# Patient Record
Sex: FEMALE | Race: ASIAN | NOT HISPANIC OR LATINO | URBAN - METROPOLITAN AREA
[De-identification: names, ages, dates, MRNs, and addresses within clinical notes are randomized per-mention and may not be internally consistent; named-entity substitution may affect disease eponyms.]

---

## 2017-07-17 ENCOUNTER — OUTPATIENT (OUTPATIENT)
Dept: OUTPATIENT SERVICES | Facility: HOSPITAL | Age: 62
LOS: 1 days | End: 2017-07-17
Payer: COMMERCIAL

## 2017-07-17 ENCOUNTER — APPOINTMENT (OUTPATIENT)
Dept: MAMMOGRAPHY | Facility: IMAGING CENTER | Age: 62
End: 2017-07-17

## 2017-07-17 ENCOUNTER — APPOINTMENT (OUTPATIENT)
Dept: ULTRASOUND IMAGING | Facility: IMAGING CENTER | Age: 62
End: 2017-07-17

## 2017-07-17 DIAGNOSIS — R92.2 INCONCLUSIVE MAMMOGRAM: ICD-10-CM

## 2017-07-17 PROCEDURE — G0279: CPT

## 2017-07-17 PROCEDURE — 77066 DX MAMMO INCL CAD BI: CPT

## 2017-07-17 PROCEDURE — 76641 ULTRASOUND BREAST COMPLETE: CPT

## 2018-02-28 ENCOUNTER — OUTPATIENT (OUTPATIENT)
Dept: OUTPATIENT SERVICES | Facility: HOSPITAL | Age: 63
LOS: 1 days | End: 2018-02-28
Payer: COMMERCIAL

## 2018-02-28 ENCOUNTER — APPOINTMENT (OUTPATIENT)
Dept: ULTRASOUND IMAGING | Facility: IMAGING CENTER | Age: 63
End: 2018-02-28
Payer: COMMERCIAL

## 2018-02-28 ENCOUNTER — APPOINTMENT (OUTPATIENT)
Dept: MAMMOGRAPHY | Facility: IMAGING CENTER | Age: 63
End: 2018-02-28
Payer: COMMERCIAL

## 2018-02-28 DIAGNOSIS — R92.8 OTHER ABNORMAL AND INCONCLUSIVE FINDINGS ON DIAGNOSTIC IMAGING OF BREAST: ICD-10-CM

## 2018-02-28 PROCEDURE — 76641 ULTRASOUND BREAST COMPLETE: CPT | Mod: 26,50

## 2018-02-28 PROCEDURE — 76641 ULTRASOUND BREAST COMPLETE: CPT

## 2018-02-28 PROCEDURE — G0279: CPT

## 2018-02-28 PROCEDURE — 77066 DX MAMMO INCL CAD BI: CPT | Mod: 26

## 2018-02-28 PROCEDURE — 77066 DX MAMMO INCL CAD BI: CPT

## 2018-02-28 PROCEDURE — G0279: CPT | Mod: 26

## 2018-03-02 ENCOUNTER — RESULT REVIEW (OUTPATIENT)
Age: 63
End: 2018-03-02

## 2018-03-02 ENCOUNTER — OUTPATIENT (OUTPATIENT)
Dept: OUTPATIENT SERVICES | Facility: HOSPITAL | Age: 63
LOS: 1 days | End: 2018-03-02
Payer: COMMERCIAL

## 2018-03-02 ENCOUNTER — APPOINTMENT (OUTPATIENT)
Dept: ULTRASOUND IMAGING | Facility: IMAGING CENTER | Age: 63
End: 2018-03-02
Payer: COMMERCIAL

## 2018-03-02 DIAGNOSIS — R92.8 OTHER ABNORMAL AND INCONCLUSIVE FINDINGS ON DIAGNOSTIC IMAGING OF BREAST: ICD-10-CM

## 2018-03-02 PROCEDURE — 19083 BX BREAST 1ST LESION US IMAG: CPT

## 2018-03-02 PROCEDURE — 88305 TISSUE EXAM BY PATHOLOGIST: CPT

## 2018-03-02 PROCEDURE — 77065 DX MAMMO INCL CAD UNI: CPT | Mod: 26,RT

## 2018-03-02 PROCEDURE — 77065 DX MAMMO INCL CAD UNI: CPT

## 2018-03-02 PROCEDURE — 88305 TISSUE EXAM BY PATHOLOGIST: CPT | Mod: 26

## 2018-03-02 PROCEDURE — A4648: CPT

## 2018-03-02 PROCEDURE — 19083 BX BREAST 1ST LESION US IMAG: CPT | Mod: RT

## 2018-03-05 LAB — SURGICAL PATHOLOGY STUDY: SIGNIFICANT CHANGE UP

## 2018-09-04 ENCOUNTER — APPOINTMENT (OUTPATIENT)
Dept: ULTRASOUND IMAGING | Facility: IMAGING CENTER | Age: 63
End: 2018-09-04
Payer: COMMERCIAL

## 2018-09-04 ENCOUNTER — OUTPATIENT (OUTPATIENT)
Dept: OUTPATIENT SERVICES | Facility: HOSPITAL | Age: 63
LOS: 1 days | End: 2018-09-04
Payer: COMMERCIAL

## 2018-09-04 DIAGNOSIS — Z00.8 ENCOUNTER FOR OTHER GENERAL EXAMINATION: ICD-10-CM

## 2018-09-04 PROCEDURE — 76642 ULTRASOUND BREAST LIMITED: CPT

## 2018-09-04 PROCEDURE — 76642 ULTRASOUND BREAST LIMITED: CPT | Mod: 26,50

## 2020-01-02 ENCOUNTER — OUTPATIENT (OUTPATIENT)
Dept: OUTPATIENT SERVICES | Facility: HOSPITAL | Age: 65
LOS: 1 days | End: 2020-01-02
Payer: MEDICARE

## 2020-01-02 ENCOUNTER — APPOINTMENT (OUTPATIENT)
Dept: MAMMOGRAPHY | Facility: IMAGING CENTER | Age: 65
End: 2020-01-02
Payer: MEDICARE

## 2020-01-02 ENCOUNTER — APPOINTMENT (OUTPATIENT)
Dept: ULTRASOUND IMAGING | Facility: IMAGING CENTER | Age: 65
End: 2020-01-02
Payer: MEDICARE

## 2020-01-02 DIAGNOSIS — Z00.8 ENCOUNTER FOR OTHER GENERAL EXAMINATION: ICD-10-CM

## 2020-01-02 PROCEDURE — 77067 SCR MAMMO BI INCL CAD: CPT | Mod: 26

## 2020-01-02 PROCEDURE — 76641 ULTRASOUND BREAST COMPLETE: CPT

## 2020-01-02 PROCEDURE — 77063 BREAST TOMOSYNTHESIS BI: CPT | Mod: 26

## 2020-01-02 PROCEDURE — 76641 ULTRASOUND BREAST COMPLETE: CPT | Mod: 26,50

## 2020-01-02 PROCEDURE — 77063 BREAST TOMOSYNTHESIS BI: CPT

## 2020-01-02 PROCEDURE — 77067 SCR MAMMO BI INCL CAD: CPT

## 2021-04-01 ENCOUNTER — APPOINTMENT (OUTPATIENT)
Dept: ULTRASOUND IMAGING | Facility: IMAGING CENTER | Age: 66
End: 2021-04-01
Payer: MEDICARE

## 2021-04-01 ENCOUNTER — APPOINTMENT (OUTPATIENT)
Dept: RADIOLOGY | Facility: IMAGING CENTER | Age: 66
End: 2021-04-01

## 2021-04-01 ENCOUNTER — RESULT REVIEW (OUTPATIENT)
Age: 66
End: 2021-04-01

## 2021-04-01 ENCOUNTER — APPOINTMENT (OUTPATIENT)
Dept: MAMMOGRAPHY | Facility: IMAGING CENTER | Age: 66
End: 2021-04-01
Payer: MEDICARE

## 2021-04-01 ENCOUNTER — OUTPATIENT (OUTPATIENT)
Dept: OUTPATIENT SERVICES | Facility: HOSPITAL | Age: 66
LOS: 1 days | End: 2021-04-01
Payer: MEDICARE

## 2021-04-01 DIAGNOSIS — Z00.8 ENCOUNTER FOR OTHER GENERAL EXAMINATION: ICD-10-CM

## 2021-04-01 PROCEDURE — 77067 SCR MAMMO BI INCL CAD: CPT | Mod: 26,59

## 2021-04-01 PROCEDURE — 77065 DX MAMMO INCL CAD UNI: CPT

## 2021-04-01 PROCEDURE — 88305 TISSUE EXAM BY PATHOLOGIST: CPT | Mod: 26

## 2021-04-01 PROCEDURE — 77067 SCR MAMMO BI INCL CAD: CPT

## 2021-04-01 PROCEDURE — 77063 BREAST TOMOSYNTHESIS BI: CPT

## 2021-04-01 PROCEDURE — A4648: CPT

## 2021-04-01 PROCEDURE — 77063 BREAST TOMOSYNTHESIS BI: CPT | Mod: 26,59

## 2021-04-01 PROCEDURE — 19083 BX BREAST 1ST LESION US IMAG: CPT | Mod: LT

## 2021-04-01 PROCEDURE — 76641 ULTRASOUND BREAST COMPLETE: CPT | Mod: 26,50

## 2021-04-01 PROCEDURE — 77065 DX MAMMO INCL CAD UNI: CPT | Mod: 26,LT

## 2021-04-01 PROCEDURE — 88305 TISSUE EXAM BY PATHOLOGIST: CPT

## 2021-04-01 PROCEDURE — 76641 ULTRASOUND BREAST COMPLETE: CPT

## 2021-04-01 PROCEDURE — 19083 BX BREAST 1ST LESION US IMAG: CPT

## 2021-07-01 ENCOUNTER — OUTPATIENT (OUTPATIENT)
Dept: OUTPATIENT SERVICES | Facility: HOSPITAL | Age: 66
LOS: 1 days | End: 2021-07-01
Payer: MEDICARE

## 2021-07-01 VITALS
OXYGEN SATURATION: 96 % | WEIGHT: 164.91 LBS | DIASTOLIC BLOOD PRESSURE: 75 MMHG | RESPIRATION RATE: 16 BRPM | HEART RATE: 71 BPM | SYSTOLIC BLOOD PRESSURE: 111 MMHG | TEMPERATURE: 98 F | HEIGHT: 64 IN

## 2021-07-01 DIAGNOSIS — Z01.818 ENCOUNTER FOR OTHER PREPROCEDURAL EXAMINATION: ICD-10-CM

## 2021-07-01 DIAGNOSIS — D48.62 NEOPLASM OF UNCERTAIN BEHAVIOR OF LEFT BREAST: ICD-10-CM

## 2021-07-01 DIAGNOSIS — D24.2 BENIGN NEOPLASM OF LEFT BREAST: ICD-10-CM

## 2021-07-01 DIAGNOSIS — Z90.710 ACQUIRED ABSENCE OF BOTH CERVIX AND UTERUS: Chronic | ICD-10-CM

## 2021-07-01 LAB
ANION GAP SERPL CALC-SCNC: 11 MMOL/L — SIGNIFICANT CHANGE UP (ref 5–17)
BUN SERPL-MCNC: 14 MG/DL — SIGNIFICANT CHANGE UP (ref 7–23)
CALCIUM SERPL-MCNC: 9.7 MG/DL — SIGNIFICANT CHANGE UP (ref 8.4–10.5)
CHLORIDE SERPL-SCNC: 105 MMOL/L — SIGNIFICANT CHANGE UP (ref 96–108)
CO2 SERPL-SCNC: 23 MMOL/L — SIGNIFICANT CHANGE UP (ref 22–31)
CREAT SERPL-MCNC: 0.97 MG/DL — SIGNIFICANT CHANGE UP (ref 0.5–1.3)
GLUCOSE SERPL-MCNC: 93 MG/DL — SIGNIFICANT CHANGE UP (ref 70–99)
HCT VFR BLD CALC: 42.4 % — SIGNIFICANT CHANGE UP (ref 34.5–45)
HGB BLD-MCNC: 13.8 G/DL — SIGNIFICANT CHANGE UP (ref 11.5–15.5)
MCHC RBC-ENTMCNC: 29.7 PG — SIGNIFICANT CHANGE UP (ref 27–34)
MCHC RBC-ENTMCNC: 32.5 GM/DL — SIGNIFICANT CHANGE UP (ref 32–36)
MCV RBC AUTO: 91.2 FL — SIGNIFICANT CHANGE UP (ref 80–100)
NRBC # BLD: 0 /100 WBCS — SIGNIFICANT CHANGE UP (ref 0–0)
PLATELET # BLD AUTO: 251 K/UL — SIGNIFICANT CHANGE UP (ref 150–400)
POTASSIUM SERPL-MCNC: 4.6 MMOL/L — SIGNIFICANT CHANGE UP (ref 3.5–5.3)
POTASSIUM SERPL-SCNC: 4.6 MMOL/L — SIGNIFICANT CHANGE UP (ref 3.5–5.3)
RBC # BLD: 4.65 M/UL — SIGNIFICANT CHANGE UP (ref 3.8–5.2)
RBC # FLD: 12.5 % — SIGNIFICANT CHANGE UP (ref 10.3–14.5)
SODIUM SERPL-SCNC: 139 MMOL/L — SIGNIFICANT CHANGE UP (ref 135–145)
WBC # BLD: 8.56 K/UL — SIGNIFICANT CHANGE UP (ref 3.8–10.5)
WBC # FLD AUTO: 8.56 K/UL — SIGNIFICANT CHANGE UP (ref 3.8–10.5)

## 2021-07-01 PROCEDURE — 85027 COMPLETE CBC AUTOMATED: CPT

## 2021-07-01 PROCEDURE — G0463: CPT

## 2021-07-01 PROCEDURE — 80048 BASIC METABOLIC PNL TOTAL CA: CPT

## 2021-07-01 RX ORDER — CHLORHEXIDINE GLUCONATE 213 G/1000ML
1 SOLUTION TOPICAL ONCE
Refills: 0 | Status: DISCONTINUED | OUTPATIENT
Start: 2021-07-15 | End: 2021-07-29

## 2021-07-01 RX ORDER — LIDOCAINE HCL 20 MG/ML
0.2 VIAL (ML) INJECTION ONCE
Refills: 0 | Status: DISCONTINUED | OUTPATIENT
Start: 2021-07-15 | End: 2021-07-29

## 2021-07-01 RX ORDER — SODIUM CHLORIDE 9 MG/ML
3 INJECTION INTRAMUSCULAR; INTRAVENOUS; SUBCUTANEOUS EVERY 8 HOURS
Refills: 0 | Status: DISCONTINUED | OUTPATIENT
Start: 2021-07-15 | End: 2021-07-29

## 2021-07-01 NOTE — H&P PST ADULT - HISTORY OF PRESENT ILLNESS
66 y.o. female with h/o HTN, who underwent a check-up mammogram which revealed 2 masses in the Left breast, core biopsy suspicious for papilloma. Patient is scheduled for Left Breast Erin  Localization Excisional Biopsy.     Pedro recent Covid-19 symptoms or exposure, fully vaccinated.

## 2021-07-01 NOTE — H&P PST ADULT - ATTENDING COMMENTS
The patient is a 66 year old female who presents to undergo a left tavo  localization breast biopsy for a recent US biopsy which revealed a possible papilloma.

## 2021-07-01 NOTE — H&P PST ADULT - NSICDXPASTMEDICALHX_GEN_ALL_CORE_FT
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

## 2021-07-01 NOTE — H&P PST ADULT - NSICDXPROBLEM_GEN_ALL_CORE_FT
PROBLEM DIAGNOSES  Problem: Benign neoplasm breast, left  Assessment and Plan: Left Breast Erin  Localization Excisional Biopsy.

## 2021-07-09 PROBLEM — I10 ESSENTIAL (PRIMARY) HYPERTENSION: Chronic | Status: ACTIVE | Noted: 2021-07-01

## 2021-07-09 PROBLEM — D21.9 BENIGN NEOPLASM OF CONNECTIVE AND OTHER SOFT TISSUE, UNSPECIFIED: Chronic | Status: ACTIVE | Noted: 2021-07-01

## 2021-07-09 PROBLEM — D48.62 NEOPLASM OF UNCERTAIN BEHAVIOR OF LEFT BREAST: Chronic | Status: ACTIVE | Noted: 2021-07-01

## 2021-07-14 ENCOUNTER — APPOINTMENT (OUTPATIENT)
Dept: ULTRASOUND IMAGING | Facility: IMAGING CENTER | Age: 66
End: 2021-07-14
Payer: MEDICARE

## 2021-07-14 ENCOUNTER — OUTPATIENT (OUTPATIENT)
Dept: OUTPATIENT SERVICES | Facility: HOSPITAL | Age: 66
LOS: 1 days | End: 2021-07-14
Payer: MEDICARE

## 2021-07-14 ENCOUNTER — TRANSCRIPTION ENCOUNTER (OUTPATIENT)
Age: 66
End: 2021-07-14

## 2021-07-14 ENCOUNTER — APPOINTMENT (OUTPATIENT)
Dept: RADIOLOGY | Facility: IMAGING CENTER | Age: 66
End: 2021-07-14

## 2021-07-14 ENCOUNTER — APPOINTMENT (OUTPATIENT)
Dept: RADIOLOGY | Facility: IMAGING CENTER | Age: 66
End: 2021-07-14
Payer: MEDICARE

## 2021-07-14 DIAGNOSIS — D48.62 NEOPLASM OF UNCERTAIN BEHAVIOR OF LEFT BREAST: ICD-10-CM

## 2021-07-14 DIAGNOSIS — Z00.8 ENCOUNTER FOR OTHER GENERAL EXAMINATION: ICD-10-CM

## 2021-07-14 DIAGNOSIS — Z86.018 PERSONAL HISTORY OF OTHER BENIGN NEOPLASM: ICD-10-CM

## 2021-07-14 DIAGNOSIS — Z90.710 ACQUIRED ABSENCE OF BOTH CERVIX AND UTERUS: Chronic | ICD-10-CM

## 2021-07-14 DIAGNOSIS — Z80.3 FAMILY HISTORY OF MALIGNANT NEOPLASM OF BREAST: ICD-10-CM

## 2021-07-14 PROCEDURE — 19285 PERQ DEV BREAST 1ST US IMAG: CPT

## 2021-07-14 PROCEDURE — 19285 PERQ DEV BREAST 1ST US IMAG: CPT | Mod: LT

## 2021-07-14 PROCEDURE — 77080 DXA BONE DENSITY AXIAL: CPT | Mod: 26

## 2021-07-14 PROCEDURE — C1739: CPT

## 2021-07-14 PROCEDURE — 77080 DXA BONE DENSITY AXIAL: CPT

## 2021-07-15 ENCOUNTER — OUTPATIENT (OUTPATIENT)
Dept: OUTPATIENT SERVICES | Facility: HOSPITAL | Age: 66
LOS: 1 days | End: 2021-07-15
Payer: MEDICARE

## 2021-07-15 ENCOUNTER — RESULT REVIEW (OUTPATIENT)
Age: 66
End: 2021-07-15

## 2021-07-15 VITALS
TEMPERATURE: 99 F | SYSTOLIC BLOOD PRESSURE: 109 MMHG | WEIGHT: 164.91 LBS | HEIGHT: 64 IN | HEART RATE: 70 BPM | DIASTOLIC BLOOD PRESSURE: 72 MMHG | RESPIRATION RATE: 14 BRPM | OXYGEN SATURATION: 97 %

## 2021-07-15 VITALS
RESPIRATION RATE: 16 BRPM | TEMPERATURE: 98 F | SYSTOLIC BLOOD PRESSURE: 116 MMHG | OXYGEN SATURATION: 100 % | DIASTOLIC BLOOD PRESSURE: 77 MMHG | HEART RATE: 80 BPM

## 2021-07-15 DIAGNOSIS — D48.62 NEOPLASM OF UNCERTAIN BEHAVIOR OF LEFT BREAST: ICD-10-CM

## 2021-07-15 DIAGNOSIS — Z01.818 ENCOUNTER FOR OTHER PREPROCEDURAL EXAMINATION: ICD-10-CM

## 2021-07-15 DIAGNOSIS — Z90.710 ACQUIRED ABSENCE OF BOTH CERVIX AND UTERUS: Chronic | ICD-10-CM

## 2021-07-15 PROCEDURE — 76098 X-RAY EXAM SURGICAL SPECIMEN: CPT

## 2021-07-15 PROCEDURE — 88305 TISSUE EXAM BY PATHOLOGIST: CPT | Mod: 26

## 2021-07-15 PROCEDURE — 88305 TISSUE EXAM BY PATHOLOGIST: CPT

## 2021-07-15 PROCEDURE — 76098 X-RAY EXAM SURGICAL SPECIMEN: CPT | Mod: 26

## 2021-07-15 PROCEDURE — 19101 BIOPSY OF BREAST OPEN: CPT | Mod: LT

## 2021-07-15 RX ORDER — ONDANSETRON 8 MG/1
4 TABLET, FILM COATED ORAL ONCE
Refills: 0 | Status: DISCONTINUED | OUTPATIENT
Start: 2021-07-15 | End: 2021-07-29

## 2021-07-15 RX ORDER — OXYCODONE HYDROCHLORIDE 5 MG/1
5 TABLET ORAL ONCE
Refills: 0 | Status: DISCONTINUED | OUTPATIENT
Start: 2021-07-15 | End: 2021-07-15

## 2021-07-15 RX ORDER — SODIUM CHLORIDE 9 MG/ML
1000 INJECTION, SOLUTION INTRAVENOUS
Refills: 0 | Status: DISCONTINUED | OUTPATIENT
Start: 2021-07-15 | End: 2021-07-29

## 2021-07-15 RX ORDER — BENAZEPRIL HYDROCHLORIDE 40 MG/1
1 TABLET ORAL
Qty: 0 | Refills: 0 | DISCHARGE

## 2021-07-15 RX ORDER — AMLODIPINE BESYLATE 2.5 MG/1
1 TABLET ORAL
Qty: 0 | Refills: 0 | DISCHARGE

## 2021-07-15 NOTE — ASU DISCHARGE PLAN (ADULT/PEDIATRIC) - ASU DC SPECIAL INSTRUCTIONSFT
Susan M. Palleschi, MD, FACS  Specializing in Diseases and Surgery of the Breast  [address], [address], [place] [address]  Tel: [phone]		Fax: [phone]    Breast Biopsy/Lumpectomy Discharge Instructions    1.	Follow-up Appointment- Please call the office [phone]) to schedule your post-operative appointment which should be approximately 7-10 days after your surgery.     2.	Bruising/Bleeding/ Swelling – It is normal for there to be some bruising and swelling at the breast biopsy site. Some discomfort at the surgical site is also normal. If your symptoms seem excessive, or if you have any questions or concerns, please call the office.    3.	Supportive Bra- Please bring a sports/athlete bra with a front or back closure with you on the day of surgery. You will wear it home from the hospital. You should wear the supportive bra continuously for 48 hours after your procedure, including wearing it to sleep. Thereafter, you may regular bra. You may remove the bra to shower. The sports bra will provide support, decrease the amount of swelling at the biopsy site and make your recovery more comfortable.    4.	Wound Dressing – The incision(s) will be covered with a special type of surgery glue called Dermabond. It has a purplish hue and looks like plastic. It should stay on the skin until it flakes off naturally over the following 1-2 weeks. Please do NOT peel off the Dermabond. All of the stitches are “internal” and will dissolve naturally.     5.	Ice- You may apply ice to the surgical sites off and on as needed for symptomatic relief.     6.	Showering/Bathing- You may shower over the incision the very next day after surgery. Allow the water to run over the incision, but do NOT scrub the area. It is best not to sit in a bathtub or swimming pool for at least 1 week after surgery.     7.	Activity Level- You may resume most normal daily activity as tolerated, but avoid strenuous activities such as aerobics, jogging, exercising, or heavy lifting for at least 1 week after surgery. You may return to work in 1-2 days after surgery. You may drive as long as you are not taking any prescription pain medication.     8.	Pain Medication- We will send a pain medication prescription to your pharmacy. You may take the prescribed medication, or you may take extra strength Tylenol as needed. Please do NOT take aspirin, Motrin, Advil, or other anti-inflammatory medications, as these medications may cause bleeding and bruising.         Anesthesia Precautions:  For the next 12 hours do not:   •	drive a car,  •	 drink alcohol, beer, or wine,   •	make important personal or business decisions  Diet:   •	Progress diet slowly unless otherwise indicated  Physician Notification  •	Any pharmacy prescription issue  •	Bleeding that does not stop  •	Persistent nausea and vomiting  •	Pain not relieved by medications  •	Fever greater than 101®F  •	Inability to tolerate liquids or foods  •	Unable to urinate after 8 hours  Discharge and Disposition  •	Discharge to home/ group home/assisted living  •	Accompanied by Family/Spouse/ Parents/ Significant Other/ Friend/ and or Caregiver  Follow Up Care:  •	In the event that you develop a complication and you are unable to reach your own physician, you may contact:  [phone] or go to the nearest Emergency Room.   •	Please call your surgeon to schedule your follow up appointment [phone].

## 2021-07-15 NOTE — BRIEF OPERATIVE NOTE - NSICDXBRIEFPROCEDURE_GEN_ALL_CORE_FT
PROCEDURES:  Excisional breast biopsy with radiologic localization 15-Jul-2021 12:33:12  Palleschi, Susan M

## 2021-07-15 NOTE — ASU DISCHARGE PLAN (ADULT/PEDIATRIC) - NURSING INSTRUCTIONS
Next dose of Tylenol will be on or after _____5:45 PM______ ,today/tonight and every 6 hours afterwards as needed for pain management, do not take any Tylenol containing products until this time. Your first dose of Tylenol was given at _______11:45 AM____. Do not exceed more than 4000mg of Tylenol in one 24 hour setting.

## 2021-07-19 LAB — SURGICAL PATHOLOGY STUDY: SIGNIFICANT CHANGE UP

## 2021-11-17 NOTE — ASU PATIENT PROFILE, ADULT - HISTORY OF COVID-19 VACCINATION
I called patient daughter who got her appt for tomorrow.  
PATIENT STILL WHEEZING.  WOULD LIKE A Z-PACK.      PHARMACY:  [facility]    PLEASE CALL [phone]  
Yes

## 2022-04-13 ENCOUNTER — APPOINTMENT (OUTPATIENT)
Dept: MAMMOGRAPHY | Facility: IMAGING CENTER | Age: 67
End: 2022-04-13
Payer: MEDICARE

## 2022-04-13 ENCOUNTER — OUTPATIENT (OUTPATIENT)
Dept: OUTPATIENT SERVICES | Facility: HOSPITAL | Age: 67
LOS: 1 days | End: 2022-04-13
Payer: MEDICARE

## 2022-04-13 ENCOUNTER — APPOINTMENT (OUTPATIENT)
Dept: MAMMOGRAPHY | Facility: IMAGING CENTER | Age: 67
End: 2022-04-13

## 2022-04-13 ENCOUNTER — APPOINTMENT (OUTPATIENT)
Dept: ULTRASOUND IMAGING | Facility: IMAGING CENTER | Age: 67
End: 2022-04-13
Payer: MEDICARE

## 2022-04-13 ENCOUNTER — APPOINTMENT (OUTPATIENT)
Dept: ULTRASOUND IMAGING | Facility: IMAGING CENTER | Age: 67
End: 2022-04-13

## 2022-04-13 DIAGNOSIS — Z90.710 ACQUIRED ABSENCE OF BOTH CERVIX AND UTERUS: Chronic | ICD-10-CM

## 2022-04-13 DIAGNOSIS — Z00.8 ENCOUNTER FOR OTHER GENERAL EXAMINATION: ICD-10-CM

## 2022-04-13 PROCEDURE — 77066 DX MAMMO INCL CAD BI: CPT

## 2022-04-13 PROCEDURE — 77066 DX MAMMO INCL CAD BI: CPT | Mod: 26

## 2022-04-13 PROCEDURE — 76641 ULTRASOUND BREAST COMPLETE: CPT | Mod: 26,50

## 2022-04-13 PROCEDURE — G0279: CPT

## 2022-04-13 PROCEDURE — 76641 ULTRASOUND BREAST COMPLETE: CPT

## 2022-04-13 PROCEDURE — G0279: CPT | Mod: 26

## 2023-04-17 ENCOUNTER — OFFICE (OUTPATIENT)
Dept: URBAN - METROPOLITAN AREA CLINIC 29 | Facility: CLINIC | Age: 68
Setting detail: OPHTHALMOLOGY
End: 2023-04-17
Payer: MEDICARE

## 2023-04-17 DIAGNOSIS — H43.811: ICD-10-CM

## 2023-04-17 DIAGNOSIS — H16.223: ICD-10-CM

## 2023-04-17 DIAGNOSIS — H43.89: ICD-10-CM

## 2023-04-17 DIAGNOSIS — H25.13: ICD-10-CM

## 2023-04-17 DIAGNOSIS — H40.033: ICD-10-CM

## 2023-04-17 DIAGNOSIS — H47.323: ICD-10-CM

## 2023-04-17 DIAGNOSIS — H52.03: ICD-10-CM

## 2023-04-17 DIAGNOSIS — H52.4: ICD-10-CM

## 2023-04-17 PROCEDURE — 92020 GONIOSCOPY: CPT | Performed by: OPHTHALMOLOGY

## 2023-04-17 PROCEDURE — 92133 CPTRZD OPH DX IMG PST SGM ON: CPT | Performed by: OPHTHALMOLOGY

## 2023-04-17 PROCEDURE — 92015 DETERMINE REFRACTIVE STATE: CPT | Performed by: OPHTHALMOLOGY

## 2023-04-17 PROCEDURE — 92014 COMPRE OPH EXAM EST PT 1/>: CPT | Performed by: OPHTHALMOLOGY

## 2023-04-17 ASSESSMENT — PACHYMETRY
OS_CT_CORRECTION: -1
OD_CT_CORRECTION: -4
OS_CT_UM: 551
OD_CT_UM: 603

## 2023-04-17 ASSESSMENT — SPHEQUIV_DERIVED
OS_SPHEQUIV: 1.375
OD_SPHEQUIV: 1.375
OD_SPHEQUIV: 0.75
OS_SPHEQUIV: 1.125
OD_SPHEQUIV: 1.25
OD_SPHEQUIV: 1.375
OS_SPHEQUIV: 1.125

## 2023-04-17 ASSESSMENT — REFRACTION_MANIFEST
OD_ADD: +2.25
OU_VA: 20/20
OD_SPHERE: +0.50
OD_ADD: +2.25
OS_AXIS: 43
OS_ADD: +2.25
OS_VA2: 20/20(J1+)
OD_CYLINDER: SPHERE
OS_CYLINDER: +0.25
OS_SPHERE: +1.00
OS_VA1: 20/20
OS_AXIS: 43
OD_AXIS: 165
OD_VA1: 20/20
OU_VA: 20/20-2
OD_VA1: 20/20
OS_VA1: 20/20
OS_ADD: +2.25
OD_SPHERE: +1.00
OD_VA2: 20/20(J1+)
OS_CYLINDER: +0.25
OS_VA1: 20/25
OD_VA1: 20/20
OD_AXIS: 160
OS_CYLINDER: SPHERE
OD_CYLINDER: +0.75
OS_CYLINDER: SPHERE
OD_SPHERE: +1.00
OS_SPHERE: +1.00
OS_SPHERE: +1.00
OD_SPHERE: +3.00
OD_CYLINDER: +0.50
OD_AXIS: 176
OD_CYLINDER: +0.75
OS_SPHERE: +3.00

## 2023-04-17 ASSESSMENT — REFRACTION_AUTOREFRACTION
OS_CYLINDER: +0.75
OS_AXIS: 8
OD_AXIS: 176
OD_CYLINDER: +1.00
OD_SPHERE: +0.75
OS_SPHERE: +1.00

## 2023-04-17 ASSESSMENT — REFRACTION_CURRENTRX
OD_ADD: +1.75
OS_ADD: +1.75
OS_OVR_VA: 20/
OD_ADD: +2.25
OS_CYLINDER: +0.25
OS_ADD: +2.25
OS_CYLINDER: +0.50
OD_CYLINDER: +0.25
OD_OVR_VA: 20/
OD_SPHERE: +0.75
OS_AXIS: 29
OS_SPHERE: +1.00
OS_AXIS: 50
OD_CYLINDER: +0.75
OD_OVR_VA: 20/
OD_AXIS: 161
OS_OVR_VA: 20/
OS_SPHERE: +0.75
OD_SPHERE: +1.00
OD_AXIS: 159

## 2023-04-17 ASSESSMENT — TONOMETRY
OD_IOP_MMHG: 18
OS_IOP_MMHG: 19

## 2023-04-17 ASSESSMENT — VISUAL ACUITY
OS_BCVA: 20/20
OD_BCVA: 20/25

## 2023-04-17 ASSESSMENT — CONFRONTATIONAL VISUAL FIELD TEST (CVF)
OD_FINDINGS: FULL
OS_FINDINGS: FULL

## 2023-04-17 ASSESSMENT — INCREASING TEAR LAKE - SEVERITY SCORE
OD_INC_TEARLAKE: T
OS_INC_TEARLAKE: T

## 2023-04-17 ASSESSMENT — SUPERFICIAL PUNCTATE KERATITIS (SPK)
OS_SPK: T
OD_SPK: T

## 2024-01-31 ENCOUNTER — OFFICE (OUTPATIENT)
Dept: URBAN - METROPOLITAN AREA CLINIC 29 | Facility: CLINIC | Age: 69
Setting detail: OPHTHALMOLOGY
End: 2024-01-31
Payer: MEDICARE

## 2024-01-31 DIAGNOSIS — H01.005: ICD-10-CM

## 2024-01-31 DIAGNOSIS — H47.323: ICD-10-CM

## 2024-01-31 DIAGNOSIS — H11.152: ICD-10-CM

## 2024-01-31 DIAGNOSIS — H43.813: ICD-10-CM

## 2024-01-31 DIAGNOSIS — H25.13: ICD-10-CM

## 2024-01-31 DIAGNOSIS — H35.362: ICD-10-CM

## 2024-01-31 DIAGNOSIS — H35.361: ICD-10-CM

## 2024-01-31 DIAGNOSIS — H16.223: ICD-10-CM

## 2024-01-31 DIAGNOSIS — H01.002: ICD-10-CM

## 2024-01-31 DIAGNOSIS — H52.03: ICD-10-CM

## 2024-01-31 DIAGNOSIS — H43.89: ICD-10-CM

## 2024-01-31 DIAGNOSIS — H40.033: ICD-10-CM

## 2024-01-31 PROCEDURE — 92133 CPTRZD OPH DX IMG PST SGM ON: CPT | Performed by: OPHTHALMOLOGY

## 2024-01-31 PROCEDURE — 92014 COMPRE OPH EXAM EST PT 1/>: CPT | Performed by: OPHTHALMOLOGY

## 2024-01-31 PROCEDURE — 92020 GONIOSCOPY: CPT | Performed by: OPHTHALMOLOGY

## 2024-01-31 ASSESSMENT — SPHEQUIV_DERIVED
OD_SPHEQUIV: 1.375
OD_SPHEQUIV: 1.375
OS_SPHEQUIV: 1.125
OS_SPHEQUIV: 1.125
OD_SPHEQUIV: 0.75

## 2024-01-31 ASSESSMENT — REFRACTION_MANIFEST
OD_CYLINDER: +0.50
OS_AXIS: 43
OS_CYLINDER: SPHERE
OD_VA2: 20/20(J1+)
OS_VA2: 20/20(J1+)
OD_ADD: +2.25
OD_VA1: 20/20
OD_AXIS: 165
OD_SPHERE: +1.00
OS_ADD: +2.25
OD_VA1: 20/20
OU_VA: 20/20-2
OU_VA: 20/20
OS_VA1: 20/20
OS_SPHERE: +3.00
OS_CYLINDER: +0.25
OS_SPHERE: +1.00
OS_AXIS: 43
OS_ADD: +2.25
OD_ADD: +2.25
OS_SPHERE: +1.00
OD_CYLINDER: +0.75
OD_VA1: 20/20
OD_AXIS: 160
OS_CYLINDER: +0.25
OD_SPHERE: +0.50
OD_AXIS: 176
OD_SPHERE: +3.00
OD_CYLINDER: SPHERE
OS_SPHERE: +1.00
OD_SPHERE: +1.00
OS_VA1: 20/20
OD_CYLINDER: +0.75
OS_VA1: 20/25
OS_CYLINDER: SPHERE

## 2024-01-31 ASSESSMENT — REFRACTION_CURRENTRX
OS_CYLINDER: +0.50
OD_OVR_VA: 20/
OS_SPHERE: +1.00
OS_OVR_VA: 20/
OD_ADD: +1.75
OD_CYLINDER: +0.25
OS_AXIS: 50
OS_OVR_VA: 20/
OD_OVR_VA: 20/
OS_SPHERE: +0.75
OS_ADD: +2.25
OD_SPHERE: +1.00
OS_AXIS: 29
OD_AXIS: 161
OS_ADD: +1.75
OS_CYLINDER: +0.25
OD_SPHERE: +0.75
OD_AXIS: 159
OD_ADD: +2.25
OD_CYLINDER: +0.75

## 2024-01-31 ASSESSMENT — LID EXAM ASSESSMENTS
OS_BLEPHARITIS: LLL T
OD_BLEPHARITIS: RLL T

## 2024-01-31 ASSESSMENT — CONFRONTATIONAL VISUAL FIELD TEST (CVF)
OS_FINDINGS: FULL
OD_FINDINGS: FULL

## 2024-07-09 NOTE — BRIEF OPERATIVE NOTE - NSICDXBRIEFPREOP_GEN_ALL_CORE_FT
PRE-OP DIAGNOSIS:  Papilloma of left breast 15-Jul-2021 12:33:25  Palleschi, Susan M   Urine Pregnancy Test Result: negative Detail Level: None Expiration Date (Optional): 04/08/2025 Performed By: Karla PARK MA

## 2024-07-16 ENCOUNTER — APPOINTMENT (OUTPATIENT)
Dept: MAMMOGRAPHY | Facility: IMAGING CENTER | Age: 69
End: 2024-07-16
Payer: MEDICARE

## 2024-07-16 ENCOUNTER — OUTPATIENT (OUTPATIENT)
Dept: OUTPATIENT SERVICES | Facility: HOSPITAL | Age: 69
LOS: 1 days | End: 2024-07-16
Payer: MEDICARE

## 2024-07-16 DIAGNOSIS — Z00.8 ENCOUNTER FOR OTHER GENERAL EXAMINATION: ICD-10-CM

## 2024-07-16 DIAGNOSIS — Z90.710 ACQUIRED ABSENCE OF BOTH CERVIX AND UTERUS: Chronic | ICD-10-CM

## 2024-07-16 PROCEDURE — 77067 SCR MAMMO BI INCL CAD: CPT

## 2024-07-16 PROCEDURE — 77067 SCR MAMMO BI INCL CAD: CPT | Mod: 26

## 2024-07-16 PROCEDURE — 77063 BREAST TOMOSYNTHESIS BI: CPT

## 2024-07-16 PROCEDURE — 77063 BREAST TOMOSYNTHESIS BI: CPT | Mod: 26

## 2024-11-18 ENCOUNTER — OFFICE (OUTPATIENT)
Dept: URBAN - METROPOLITAN AREA CLINIC 29 | Facility: CLINIC | Age: 69
Setting detail: OPHTHALMOLOGY
End: 2024-11-18
Payer: MEDICARE

## 2024-11-18 DIAGNOSIS — H25.13: ICD-10-CM

## 2024-11-18 DIAGNOSIS — H52.03: ICD-10-CM

## 2024-11-18 DIAGNOSIS — H11.152: ICD-10-CM

## 2024-11-18 DIAGNOSIS — H16.223: ICD-10-CM

## 2024-11-18 DIAGNOSIS — H40.033: ICD-10-CM

## 2024-11-18 DIAGNOSIS — H01.005: ICD-10-CM

## 2024-11-18 DIAGNOSIS — H01.002: ICD-10-CM

## 2024-11-18 PROCEDURE — 92250 FUNDUS PHOTOGRAPHY W/I&R: CPT | Performed by: OPHTHALMOLOGY

## 2024-11-18 PROCEDURE — 92014 COMPRE OPH EXAM EST PT 1/>: CPT | Performed by: OPHTHALMOLOGY

## 2024-11-18 PROCEDURE — 92020 GONIOSCOPY: CPT | Performed by: OPHTHALMOLOGY

## 2024-11-18 PROCEDURE — 92083 EXTENDED VISUAL FIELD XM: CPT | Performed by: OPHTHALMOLOGY

## 2024-11-18 ASSESSMENT — REFRACTION_MANIFEST
OD_SPHERE: +3.00
OS_ADD: +2.25
OS_AXIS: 43
OD_AXIS: 5
OS_CYLINDER: +0.75
OD_SPHERE: +0.50
OD_VA2: 20/20(J1+)
OD_SPHERE: +1.00
OS_CYLINDER: +0.25
OD_VA1: 20/20
OS_VA1: 20/25
OU_VA: 20/20
OS_SPHERE: +1.00
OD_CYLINDER: +0.75
OD_VA1: 20/20
OD_AXIS: 165
OS_SPHERE: +1.00
OD_VA1: 20/20
OD_VA1: 20/20
OS_ADD: +2.25
OD_CYLINDER: +0.75
OS_CYLINDER: SPHERE
OD_CYLINDER: +0.75
OD_ADD: +2.25
OD_SPHERE: +1.00
OD_CYLINDER: SPHERE
OS_SPHERE: +3.00
OS_SPHERE: +1.00
OS_CYLINDER: SPHERE
OS_VA2: 20/20(J1+)
OD_CYLINDER: +0.50
OS_AXIS: 43
OD_AXIS: 176
OS_VA1: 20/20
OS_CYLINDER: +0.25
OD_SPHERE: +1.00
OU_VA: 20/20-2
OS_AXIS: 025
OD_ADD: +2.25
OS_ADD: +2.25
OS_SPHERE: PLANO
OS_VA1: 20/40
OS_VA1: 20/20
OD_ADD: +2.25
OD_AXIS: 160

## 2024-11-18 ASSESSMENT — REFRACTION_AUTOREFRACTION
OS_SPHERE: PLANO
OD_SPHERE: +1.00
OS_CYLINDER: +0.75
OD_CYLINDER: +0.75
OD_AXIS: 001
OS_AXIS: 025

## 2024-11-18 ASSESSMENT — REFRACTION_CURRENTRX
OS_ADD: +1.75
OD_OVR_VA: 20/
OD_CYLINDER: +0.25
OS_SPHERE: +0.75
OS_AXIS: 29
OD_AXIS: 161
OD_SPHERE: +0.75
OS_OVR_VA: 20/
OD_AXIS: 159
OS_AXIS: 50
OD_ADD: +1.75
OS_ADD: +2.25
OD_OVR_VA: 20/
OS_CYLINDER: +0.50
OS_SPHERE: +1.00
OS_CYLINDER: +0.25
OD_SPHERE: +1.00
OD_CYLINDER: +0.75
OS_OVR_VA: 20/
OD_ADD: +2.25

## 2024-11-18 ASSESSMENT — VISUAL ACUITY
OD_BCVA: 20/30-2
OS_BCVA: 20/25-1

## 2024-11-18 ASSESSMENT — TONOMETRY
OS_IOP_MMHG: 17
OD_IOP_MMHG: 18

## 2024-11-18 ASSESSMENT — SUPERFICIAL PUNCTATE KERATITIS (SPK)
OS_SPK: T
OD_SPK: T

## 2024-11-18 ASSESSMENT — PACHYMETRY
OS_CT_CORRECTION: -1
OD_CT_UM: 603
OD_CT_CORRECTION: -4
OS_CT_UM: 551

## 2024-11-18 ASSESSMENT — CONFRONTATIONAL VISUAL FIELD TEST (CVF)
OD_FINDINGS: FULL
OS_FINDINGS: FULL

## 2024-11-18 ASSESSMENT — DECREASING TEAR LAKE - SEVERITY SCORE
OD_DEC_TEARLAKE: T
OS_DEC_TEARLAKE: T

## 2024-11-18 ASSESSMENT — LID EXAM ASSESSMENTS
OD_BLEPHARITIS: RLL T
OS_BLEPHARITIS: LLL T

## 2025-09-03 ENCOUNTER — NEW PATIENT (OUTPATIENT)
Dept: URBAN - METROPOLITAN AREA CLINIC 48 | Facility: CLINIC | Age: 70
End: 2025-09-03

## 2025-09-03 DIAGNOSIS — H43.393: ICD-10-CM

## 2025-09-03 DIAGNOSIS — H40.023: ICD-10-CM

## 2025-09-03 DIAGNOSIS — H40.033: ICD-10-CM

## 2025-09-03 DIAGNOSIS — H25.13: ICD-10-CM

## 2025-09-03 PROCEDURE — 92132 CPTRZD OPH DX IMG ANT SGM: CPT

## 2025-09-03 PROCEDURE — 92134 CPTRZ OPH DX IMG PST SGM RTA: CPT | Mod: NC

## 2025-09-03 PROCEDURE — 92250 FUNDUS PHOTOGRAPHY W/I&R: CPT

## 2025-09-03 PROCEDURE — 92020 GONIOSCOPY: CPT

## 2025-09-03 PROCEDURE — 99204 OFFICE O/P NEW MOD 45 MIN: CPT

## 2025-09-03 ASSESSMENT — VISUAL ACUITY
OD_CC: 20/40
OD_GLARE: 20/50
OS_CC: 20/50
OS_SC: 20/60+1
OD_SC: 20/25-2
OS_CC: 20/40
OD_CC: 20/25+2

## 2025-09-03 ASSESSMENT — TONOMETRY
OD_IOP_MMHG: 22
OS_IOP_MMHG: 21
OS_IOP_MMHG: 17
OD_IOP_MMHG: 16